# Patient Record
Sex: FEMALE | Race: WHITE | NOT HISPANIC OR LATINO | URBAN - METROPOLITAN AREA
[De-identification: names, ages, dates, MRNs, and addresses within clinical notes are randomized per-mention and may not be internally consistent; named-entity substitution may affect disease eponyms.]

---

## 2021-07-13 ENCOUNTER — EMERGENCY (EMERGENCY)
Facility: HOSPITAL | Age: 66
LOS: 1 days | Discharge: LEFT BEFORE DISCHARGE | End: 2021-07-13
Attending: EMERGENCY MEDICINE
Payer: SELF-PAY

## 2021-07-13 VITALS
SYSTOLIC BLOOD PRESSURE: 118 MMHG | DIASTOLIC BLOOD PRESSURE: 71 MMHG | HEART RATE: 93 BPM | RESPIRATION RATE: 18 BRPM | HEIGHT: 62 IN | TEMPERATURE: 98 F | OXYGEN SATURATION: 98 % | WEIGHT: 175.05 LBS

## 2021-07-13 NOTE — ED ADULT TRIAGE NOTE - CHIEF COMPLAINT QUOTE
[facility].  Patient denies recent injury, ambulatory to ambulance and to triage.

## 2021-07-13 NOTE — ED ADULT NURSE NOTE - CHIEF COMPLAINT QUOTE
[facility].  Patient denies recent injury, ambulatory to ambulance and to triage.

## 2021-07-13 NOTE — ED PROVIDER NOTE - OBJECTIVE STATEMENT
64 y/o female c/o leg pain. Pt denies fall. Pt denies fever. Pt now states she has no problems, needs a medicaid [facility] home.

## 2021-07-13 NOTE — ED ADULT TRIAGE NOTE - NS ED NURSE AMBULANCES
[facility] Include Location In Plan?: No Detail Level: Simple Additional Note: Noted in HPI